# Patient Record
Sex: FEMALE | Race: WHITE
[De-identification: names, ages, dates, MRNs, and addresses within clinical notes are randomized per-mention and may not be internally consistent; named-entity substitution may affect disease eponyms.]

---

## 2020-05-12 ENCOUNTER — HOSPITAL ENCOUNTER (OUTPATIENT)
Dept: HOSPITAL 75 - OCC | Age: 50
Discharge: HOME | End: 2020-05-12
Attending: NURSE PRACTITIONER

## 2020-05-12 PROCEDURE — 73060 X-RAY EXAM OF HUMERUS: CPT

## 2020-05-12 PROCEDURE — 73030 X-RAY EXAM OF SHOULDER: CPT

## 2020-05-12 NOTE — DIAGNOSTIC IMAGING REPORT
INDICATION: Acute left arm and shoulder pain.



TIME OF EXAM: 11:48 AM



2 views of left humerus were obtained.



Alignment at the shoulder and elbow appears normal. The humerus

appears intact. No fractures are seen.



IMPRESSION: No acute abnormality is detected.



Dictated by: 



  Dictated on workstation # [id number]

## 2020-05-12 NOTE — DIAGNOSTIC IMAGING REPORT
INDICATION: Acute left shoulder pain.



TIME OF EXAM: 11:51 AM



Multiple views left shoulder were obtained. Glenohumeral and

acromioclavicular alignment are normal. Acromial humeral space is

normal. No fracture or dislocation is seen.



IMPRESSION: No acute bony abnormality is detected.



Dictated by: 



  Dictated on workstation # [id number]